# Patient Record
Sex: FEMALE | Race: WHITE | NOT HISPANIC OR LATINO | URBAN - METROPOLITAN AREA
[De-identification: names, ages, dates, MRNs, and addresses within clinical notes are randomized per-mention and may not be internally consistent; named-entity substitution may affect disease eponyms.]

---

## 2019-09-25 ENCOUNTER — EMERGENCY (EMERGENCY)
Facility: HOSPITAL | Age: 34
LOS: 1 days | Discharge: HOME | End: 2019-09-25
Attending: EMERGENCY MEDICINE
Payer: COMMERCIAL

## 2019-09-25 VITALS
OXYGEN SATURATION: 99 % | RESPIRATION RATE: 16 BRPM | DIASTOLIC BLOOD PRESSURE: 81 MMHG | HEIGHT: 65 IN | WEIGHT: 145.06 LBS | TEMPERATURE: 98 F | SYSTOLIC BLOOD PRESSURE: 120 MMHG | HEART RATE: 79 BPM

## 2019-09-25 PROCEDURE — 99283 EMERGENCY DEPT VISIT LOW MDM: CPT

## 2019-09-25 RX ORDER — LIDOCAINE 4 G/100G
1 CREAM TOPICAL
Qty: 5 | Refills: 0
Start: 2019-09-25 | End: 2019-09-29

## 2019-09-25 RX ORDER — ACETAMINOPHEN 500 MG
975 TABLET ORAL ONCE
Refills: 0 | Status: COMPLETED | OUTPATIENT
Start: 2019-09-25 | End: 2019-09-25

## 2019-09-25 RX ORDER — LIDOCAINE 4 G/100G
1 CREAM TOPICAL ONCE
Refills: 0 | Status: COMPLETED | OUTPATIENT
Start: 2019-09-25 | End: 2019-09-25

## 2019-09-25 RX ORDER — IBUPROFEN 200 MG
1 TABLET ORAL
Qty: 40 | Refills: 0
Start: 2019-09-25 | End: 2019-10-04

## 2019-09-25 RX ORDER — METHOCARBAMOL 500 MG/1
1500 TABLET, FILM COATED ORAL ONCE
Refills: 0 | Status: COMPLETED | OUTPATIENT
Start: 2019-09-25 | End: 2019-09-25

## 2019-09-25 RX ORDER — METHOCARBAMOL 500 MG/1
2 TABLET, FILM COATED ORAL
Qty: 30 | Refills: 0
Start: 2019-09-25 | End: 2019-09-29

## 2019-09-25 RX ADMIN — Medication 975 MILLIGRAM(S): at 12:33

## 2019-09-25 RX ADMIN — LIDOCAINE 1 PATCH: 4 CREAM TOPICAL at 12:33

## 2019-09-25 RX ADMIN — METHOCARBAMOL 1500 MILLIGRAM(S): 500 TABLET, FILM COATED ORAL at 12:33

## 2019-09-25 NOTE — ED PROVIDER NOTE - PATIENT PORTAL LINK FT
You can access the FollowMyHealth Patient Portal offered by [facility] by registering at the following website: [email or web address]. By joining [facility]’s FollowMyHealth portal, you will also be able to view your health information using other applications (apps) compatible with our system.
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
## 2019-09-25 NOTE — ED PROVIDER NOTE - PLAN OF CARE
Will provide pt with muscle relaxant and pain medication. Pt will be advised to f/u with PMD within 24-48 hours.
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
## 2020-04-01 ENCOUNTER — APPOINTMENT (OUTPATIENT)
Dept: ANTEPARTUM | Facility: CLINIC | Age: 35
End: 2020-04-01

## 2021-12-24 ENCOUNTER — TRANSCRIPTION ENCOUNTER (OUTPATIENT)
Age: 36
End: 2021-12-24

## 2022-10-10 NOTE — ED PROVIDER NOTE - PRO INTERPRETER NEED 2
English Topical Retinoid counseling:  Patient advised to apply a pea-sized amount only at bedtime and wait 30 minutes after washing their face before applying.  If too drying, patient may add a non-comedogenic moisturizer. The patient verbalized understanding of the proper use and possible adverse effects of retinoids.  All of the patient's questions and concerns were addressed.

## 2023-02-17 ENCOUNTER — OFFICE (OUTPATIENT)
Dept: URBAN - METROPOLITAN AREA CLINIC 113 | Facility: CLINIC | Age: 38
Setting detail: OPHTHALMOLOGY
End: 2023-02-17
Payer: COMMERCIAL

## 2023-02-17 ENCOUNTER — NON-APPOINTMENT (OUTPATIENT)
Age: 38
End: 2023-02-17

## 2023-02-17 DIAGNOSIS — H16.223: ICD-10-CM

## 2023-02-17 DIAGNOSIS — H01.005: ICD-10-CM

## 2023-02-17 DIAGNOSIS — H01.002: ICD-10-CM

## 2023-02-17 DIAGNOSIS — H17.9: ICD-10-CM

## 2023-02-17 DIAGNOSIS — D23.122: ICD-10-CM

## 2023-02-17 DIAGNOSIS — H04.123: ICD-10-CM

## 2023-02-17 DIAGNOSIS — H52.03: ICD-10-CM

## 2023-02-17 PROCEDURE — 92285 EXTERNAL OCULAR PHOTOGRAPHY: CPT | Performed by: OPHTHALMOLOGY

## 2023-02-17 PROCEDURE — 92015 DETERMINE REFRACTIVE STATE: CPT | Performed by: OPHTHALMOLOGY

## 2023-02-17 PROCEDURE — 92014 COMPRE OPH EXAM EST PT 1/>: CPT | Performed by: OPHTHALMOLOGY

## 2023-02-17 ASSESSMENT — LID EXAM ASSESSMENTS
OD_BLEPHARITIS: RLL T
OS_BLEPHARITIS: LLL T

## 2023-02-17 ASSESSMENT — AXIALLENGTH_DERIVED
OS_AL: 24.6441
OS_AL: 24.6974
OS_AL: 26.6559
OS_AL: 24.6974
OD_AL: 24.8015

## 2023-02-17 ASSESSMENT — REFRACTION_MANIFEST
OD_SPHERE: -4.50
OD_AXIS: 014
OS_SPHERE: +0.25
OD_VA1: 20/20
OD_CYLINDER: -0.75
OD_SPHERE: PLANO
OS_AXIS: 175
OD_AXIS: 011
OS_VA1: 20/20
OD_VA1: 20/20
OS_AXIS: 171
OS_CYLINDER: -0.50
OS_CYLINDER: -0.50
OS_VA1: 20/20
OS_VA1: 20/15-
OS_AXIS: 180
OU_VA: 20/20
OD_CYLINDER: -0.75
OD_VA1: 20/15
OS_SPHERE: +0.25
OS_CYLINDER: -0.50
OD_SPHERE: -4.50
OS_SPHERE: -4.00
OS_SPHERE: -3.75
OD_SPHERE: PLANO

## 2023-02-17 ASSESSMENT — VISUAL ACUITY
OD_BCVA: 20/20
OS_BCVA: 20/20

## 2023-02-17 ASSESSMENT — CONFRONTATIONAL VISUAL FIELD TEST (CVF)
OD_FINDINGS: FULL
OS_FINDINGS: FULL

## 2023-02-17 ASSESSMENT — TONOMETRY
OD_IOP_MMHG: 11
OS_IOP_MMHG: 11

## 2023-02-17 ASSESSMENT — REFRACTION_CURRENTRX
OS_AXIS: 171
OD_CYLINDER: -0.75
OD_AXIS: 014
OS_OVR_VA: 20/
OD_VPRISM_DIRECTION: SV
OS_SPHERE: -+0.25
OS_CYLINDER: -0.50
OS_VPRISM_DIRECTION: SV
OD_SPHERE: PLANO
OD_OVR_VA: 20/

## 2023-02-17 ASSESSMENT — SUPERFICIAL PUNCTATE KERATITIS (SPK)
OS_SPK: T
OD_SPK: T

## 2023-02-17 ASSESSMENT — KERATOMETRY
OS_K2POWER_DIOPTERS: 41.00
OS_K1POWER_DIOPTERS: 40.25
OD_K1POWER_DIOPTERS: 40.00
OS_AXISANGLE_DEGREES: 080
OD_AXISANGLE_DEGREES: 095
OD_K2POWER_DIOPTERS: 41.00

## 2023-02-17 ASSESSMENT — REFRACTION_AUTOREFRACTION
OS_AXIS: 179
OD_SPHERE: +0.25
OS_SPHERE: +0.50
OD_CYLINDER: -0.75
OS_CYLINDER: -0.75
OD_AXIS: 009

## 2023-02-17 ASSESSMENT — SPHEQUIV_DERIVED
OD_SPHEQUIV: -0.125
OS_SPHEQUIV: -4.25
OS_SPHEQUIV: 0
OS_SPHEQUIV: 0.125
OS_SPHEQUIV: 0

## 2023-03-23 ENCOUNTER — OFFICE (OUTPATIENT)
Dept: URBAN - METROPOLITAN AREA CLINIC 113 | Facility: CLINIC | Age: 38
Setting detail: OPHTHALMOLOGY
End: 2023-03-23
Payer: COMMERCIAL

## 2023-03-23 DIAGNOSIS — H02.824: ICD-10-CM

## 2023-03-23 DIAGNOSIS — D23.122: ICD-10-CM

## 2023-03-23 PROCEDURE — 92012 INTRM OPH EXAM EST PATIENT: CPT | Performed by: OPHTHALMOLOGY

## 2023-03-23 PROCEDURE — 92285 EXTERNAL OCULAR PHOTOGRAPHY: CPT | Performed by: OPHTHALMOLOGY

## 2023-03-23 ASSESSMENT — REFRACTION_MANIFEST
OS_SPHERE: +0.25
OD_AXIS: 011
OS_VA1: 20/20
OS_AXIS: 171
OS_SPHERE: -4.00
OS_SPHERE: +0.25
OD_VA1: 20/20
OD_VA1: 20/20
OS_AXIS: 180
OD_SPHERE: -4.50
OU_VA: 20/20
OS_CYLINDER: -0.50
OS_VA1: 20/15-
OD_SPHERE: PLANO
OS_CYLINDER: -0.50
OD_SPHERE: PLANO
OD_CYLINDER: -0.75
OS_CYLINDER: -0.50
OS_VA1: 20/20
OS_AXIS: 175
OD_VA1: 20/15
OD_AXIS: 014
OD_CYLINDER: -0.75
OS_SPHERE: -3.75
OD_SPHERE: -4.50

## 2023-03-23 ASSESSMENT — KERATOMETRY
OS_K2POWER_DIOPTERS: 41.00
OD_AXISANGLE_DEGREES: 095
OS_K1POWER_DIOPTERS: 40.25
OS_AXISANGLE_DEGREES: 080
OD_K2POWER_DIOPTERS: 41.00
OD_K1POWER_DIOPTERS: 40.00

## 2023-03-23 ASSESSMENT — SUPERFICIAL PUNCTATE KERATITIS (SPK)
OS_SPK: T
OD_SPK: T

## 2023-03-23 ASSESSMENT — SPHEQUIV_DERIVED
OD_SPHEQUIV: -0.125
OS_SPHEQUIV: 0
OS_SPHEQUIV: 0.125
OS_SPHEQUIV: -4.25
OS_SPHEQUIV: 0

## 2023-03-23 ASSESSMENT — AXIALLENGTH_DERIVED
OD_AL: 24.8015
OS_AL: 24.6974
OS_AL: 24.6441
OS_AL: 24.6974
OS_AL: 26.6559

## 2023-03-23 ASSESSMENT — CONFRONTATIONAL VISUAL FIELD TEST (CVF)
OS_FINDINGS: FULL
OD_FINDINGS: FULL

## 2023-03-23 ASSESSMENT — REFRACTION_AUTOREFRACTION
OS_AXIS: 179
OD_SPHERE: +0.25
OS_SPHERE: +0.50
OD_CYLINDER: -0.75
OD_AXIS: 009
OS_CYLINDER: -0.75

## 2023-03-23 ASSESSMENT — REFRACTION_CURRENTRX
OS_OVR_VA: 20/
OS_VPRISM_DIRECTION: SV
OD_VPRISM_DIRECTION: SV
OS_AXIS: 171
OD_SPHERE: PLANO
OS_SPHERE: -+0.25
OS_CYLINDER: -0.50
OD_OVR_VA: 20/
OD_CYLINDER: -0.75
OD_AXIS: 014

## 2023-03-23 ASSESSMENT — VISUAL ACUITY
OS_BCVA: 20/20
OD_BCVA: 20/25

## 2023-03-23 ASSESSMENT — LID EXAM ASSESSMENTS
OD_BLEPHARITIS: RLL T
OS_BLEPHARITIS: LLL T

## 2023-12-16 ENCOUNTER — OFFICE (OUTPATIENT)
Dept: URBAN - METROPOLITAN AREA CLINIC 12 | Facility: CLINIC | Age: 38
Setting detail: OPHTHALMOLOGY
End: 2023-12-16
Payer: COMMERCIAL

## 2023-12-16 DIAGNOSIS — H00.14: ICD-10-CM

## 2023-12-16 PROCEDURE — 92012 INTRM OPH EXAM EST PATIENT: CPT | Performed by: OPHTHALMOLOGY

## 2023-12-16 ASSESSMENT — REFRACTION_CURRENTRX
OD_AXIS: 014
OS_AXIS: 171
OD_OVR_VA: 20/
OS_CYLINDER: -0.50
OD_VPRISM_DIRECTION: SV
OS_SPHERE: -+0.25
OS_OVR_VA: 20/
OS_VPRISM_DIRECTION: SV
OD_SPHERE: PLANO
OD_CYLINDER: -0.75

## 2023-12-16 ASSESSMENT — CONFRONTATIONAL VISUAL FIELD TEST (CVF)
OS_FINDINGS: FULL
OD_FINDINGS: FULL

## 2023-12-16 ASSESSMENT — REFRACTION_MANIFEST
OD_VA1: 20/20
OS_AXIS: 180
OS_VA1: 20/20
OS_SPHERE: +0.25
OS_SPHERE: -4.00
OS_AXIS: 171
OD_CYLINDER: -0.75
OS_VA1: 20/15-
OS_CYLINDER: -0.50
OS_SPHERE: +0.25
OS_AXIS: 175
OD_VA1: 20/15
OD_CYLINDER: -0.75
OD_AXIS: 014
OS_SPHERE: -3.75
OS_CYLINDER: -0.50
OD_VA1: 20/20
OD_AXIS: 011
OD_SPHERE: -4.50
OD_SPHERE: PLANO
OU_VA: 20/20
OD_SPHERE: -4.50
OS_CYLINDER: -0.50
OD_SPHERE: PLANO
OS_VA1: 20/20

## 2023-12-16 ASSESSMENT — REFRACTION_AUTOREFRACTION
OS_SPHERE: +0.25
OD_CYLINDER: -0.75
OS_AXIS: 177
OD_AXIS: 017
OS_CYLINDER: -0.75
OD_SPHERE: +0.25

## 2023-12-16 ASSESSMENT — SUPERFICIAL PUNCTATE KERATITIS (SPK)
OS_SPK: T
OD_SPK: T

## 2023-12-16 ASSESSMENT — SPHEQUIV_DERIVED
OS_SPHEQUIV: -4.25
OS_SPHEQUIV: -0.125
OS_SPHEQUIV: 0
OS_SPHEQUIV: 0
OD_SPHEQUIV: -0.125

## 2023-12-16 ASSESSMENT — LID EXAM ASSESSMENTS
OS_BLEPHARITIS: LLL T
OD_BLEPHARITIS: RLL T

## 2024-01-02 ENCOUNTER — OFFICE (OUTPATIENT)
Dept: URBAN - METROPOLITAN AREA CLINIC 100 | Facility: CLINIC | Age: 39
Setting detail: OPHTHALMOLOGY
End: 2024-01-02
Payer: COMMERCIAL

## 2024-01-02 ENCOUNTER — RX ONLY (RX ONLY)
Age: 39
End: 2024-01-02

## 2024-01-02 DIAGNOSIS — H02.824: ICD-10-CM

## 2024-01-02 DIAGNOSIS — H00.14: ICD-10-CM

## 2024-01-02 PROBLEM — H52.7 REFRACTIVE ERROR: Status: ACTIVE | Noted: 2023-12-16

## 2024-01-02 PROCEDURE — 92285 EXTERNAL OCULAR PHOTOGRAPHY: CPT | Performed by: OPHTHALMOLOGY

## 2024-01-02 PROCEDURE — 67800 REMOVE EYELID LESION: CPT | Mod: 51,E1 | Performed by: OPHTHALMOLOGY

## 2024-01-02 PROCEDURE — 67840 REMOVE EYELID LESION: CPT | Mod: E1 | Performed by: OPHTHALMOLOGY

## 2024-01-02 ASSESSMENT — SPHEQUIV_DERIVED
OS_SPHEQUIV: -0.125
OD_SPHEQUIV: -0.125

## 2024-01-02 ASSESSMENT — REFRACTION_AUTOREFRACTION
OS_SPHERE: +0.25
OD_AXIS: 017
OD_SPHERE: +0.25
OD_CYLINDER: -0.75
OS_AXIS: 177
OS_CYLINDER: -0.75

## 2024-01-02 ASSESSMENT — SUPERFICIAL PUNCTATE KERATITIS (SPK)
OS_SPK: T
OD_SPK: T

## 2024-01-02 ASSESSMENT — CONFRONTATIONAL VISUAL FIELD TEST (CVF)
OD_FINDINGS: FULL
OS_FINDINGS: FULL

## 2024-01-02 ASSESSMENT — LID EXAM ASSESSMENTS
OD_BLEPHARITIS: RLL T
OS_BLEPHARITIS: LLL T

## 2024-01-16 ENCOUNTER — OFFICE (OUTPATIENT)
Dept: URBAN - METROPOLITAN AREA CLINIC 100 | Facility: CLINIC | Age: 39
Setting detail: OPHTHALMOLOGY
End: 2024-01-16
Payer: COMMERCIAL

## 2024-01-16 DIAGNOSIS — H02.824: ICD-10-CM

## 2024-01-16 DIAGNOSIS — H01.005: ICD-10-CM

## 2024-01-16 DIAGNOSIS — H00.14: ICD-10-CM

## 2024-01-16 DIAGNOSIS — H01.002: ICD-10-CM

## 2024-01-16 PROCEDURE — 99213 OFFICE O/P EST LOW 20 MIN: CPT | Performed by: OPHTHALMOLOGY

## 2024-01-16 ASSESSMENT — LID EXAM ASSESSMENTS
OS_BLEPHARITIS: LLL T
OD_BLEPHARITIS: RLL T

## 2024-01-16 ASSESSMENT — SPHEQUIV_DERIVED
OS_SPHEQUIV: -0.125
OD_SPHEQUIV: -0.125

## 2024-01-16 ASSESSMENT — REFRACTION_AUTOREFRACTION
OS_SPHERE: +0.25
OS_CYLINDER: -0.75
OD_CYLINDER: -0.75
OS_AXIS: 177
OD_SPHERE: +0.25
OD_AXIS: 017

## 2024-01-16 ASSESSMENT — CONFRONTATIONAL VISUAL FIELD TEST (CVF)
OS_FINDINGS: FULL
OD_FINDINGS: FULL

## 2024-01-16 ASSESSMENT — SUPERFICIAL PUNCTATE KERATITIS (SPK)
OS_SPK: T
OD_SPK: T

## 2024-08-06 ENCOUNTER — APPOINTMENT (OUTPATIENT)
Dept: DERMATOLOGY | Facility: CLINIC | Age: 39
End: 2024-08-06

## 2024-08-06 PROCEDURE — 99204 OFFICE O/P NEW MOD 45 MIN: CPT

## 2025-03-13 ENCOUNTER — APPOINTMENT (OUTPATIENT)
Dept: INTERNAL MEDICINE | Facility: CLINIC | Age: 40
End: 2025-03-13
Payer: COMMERCIAL

## 2025-03-13 VITALS
WEIGHT: 163 LBS | BODY MASS INDEX: 27.16 KG/M2 | SYSTOLIC BLOOD PRESSURE: 110 MMHG | OXYGEN SATURATION: 100 % | HEIGHT: 65 IN | HEART RATE: 71 BPM | DIASTOLIC BLOOD PRESSURE: 72 MMHG | TEMPERATURE: 98 F

## 2025-03-13 DIAGNOSIS — E66.3 OVERWEIGHT: ICD-10-CM

## 2025-03-13 DIAGNOSIS — Z80.3 FAMILY HISTORY OF MALIGNANT NEOPLASM OF BREAST: ICD-10-CM

## 2025-03-13 DIAGNOSIS — Z00.00 ENCOUNTER FOR GENERAL ADULT MEDICAL EXAMINATION W/OUT ABNORMAL FINDINGS: ICD-10-CM

## 2025-03-13 DIAGNOSIS — F41.1 GENERALIZED ANXIETY DISORDER: ICD-10-CM

## 2025-03-13 DIAGNOSIS — E66.9 OVERWEIGHT: ICD-10-CM

## 2025-03-13 PROCEDURE — G0447 BEHAVIOR COUNSEL OBESITY 15M: CPT | Mod: NC,59

## 2025-03-13 PROCEDURE — 99385 PREV VISIT NEW AGE 18-39: CPT

## 2025-03-13 RX ORDER — ZINC SULFATE 50(220)MG
CAPSULE ORAL
Refills: 0 | Status: ACTIVE | COMMUNITY

## 2025-03-13 RX ORDER — GLUC/MSM/COLGN2/HYAL/ANTIARTH3 375-375-20
TABLET ORAL
Refills: 0 | Status: ACTIVE | COMMUNITY

## 2025-03-18 ENCOUNTER — LABORATORY RESULT (OUTPATIENT)
Age: 40
End: 2025-03-18

## 2025-03-23 LAB
ALBUMIN SERPL ELPH-MCNC: 4.6 G/DL
ALP BLD-CCNC: 60 U/L
ALT SERPL-CCNC: 13 U/L
ANION GAP SERPL CALC-SCNC: 13 MMOL/L
APPEARANCE: ABNORMAL
AST SERPL-CCNC: 16 U/L
BASOPHILS # BLD AUTO: 0.02 K/UL
BASOPHILS NFR BLD AUTO: 0.3 %
BILIRUB SERPL-MCNC: 0.4 MG/DL
BILIRUBIN URINE: NEGATIVE
BLOOD URINE: ABNORMAL
BUN SERPL-MCNC: 18 MG/DL
CALCIUM SERPL-MCNC: 9.6 MG/DL
CHLORIDE SERPL-SCNC: 104 MMOL/L
CHOLEST SERPL-MCNC: 228 MG/DL
CO2 SERPL-SCNC: 22 MMOL/L
COLOR: YELLOW
CREAT SERPL-MCNC: 0.87 MG/DL
EGFRCR SERPLBLD CKD-EPI 2021: 87 ML/MIN/1.73M2
EOSINOPHIL # BLD AUTO: 0.05 K/UL
EOSINOPHIL NFR BLD AUTO: 0.9 %
FERRITIN SERPL-MCNC: 120 NG/ML
GLUCOSE QUALITATIVE U: NEGATIVE MG/DL
GLUCOSE SERPL-MCNC: 91 MG/DL
HCT VFR BLD CALC: 38.3 %
HDLC SERPL-MCNC: 42 MG/DL
HGB BLD-MCNC: 13.3 G/DL
IMM GRANULOCYTES NFR BLD AUTO: 0.2 %
IRON SATN MFR SERPL: 28 %
IRON SERPL-MCNC: 88 UG/DL
KETONES URINE: NEGATIVE MG/DL
LDLC SERPL CALC-MCNC: 167 MG/DL
LEUKOCYTE ESTERASE URINE: NEGATIVE
LYMPHOCYTES # BLD AUTO: 2.72 K/UL
LYMPHOCYTES NFR BLD AUTO: 47.5 %
MAN DIFF?: NORMAL
MCHC RBC-ENTMCNC: 31.1 PG
MCHC RBC-ENTMCNC: 34.7 G/DL
MCV RBC AUTO: 89.7 FL
MONOCYTES # BLD AUTO: 0.33 K/UL
MONOCYTES NFR BLD AUTO: 5.8 %
NEUTROPHILS # BLD AUTO: 2.6 K/UL
NEUTROPHILS NFR BLD AUTO: 45.3 %
NITRITE URINE: NEGATIVE
NONHDLC SERPL-MCNC: 186 MG/DL
PH URINE: 5.5
PLATELET # BLD AUTO: 263 K/UL
POTASSIUM SERPL-SCNC: 4.5 MMOL/L
PROT SERPL-MCNC: 7.5 G/DL
PROTEIN URINE: NEGATIVE MG/DL
RBC # BLD: 4.27 M/UL
RBC # FLD: 12.2 %
SODIUM SERPL-SCNC: 139 MMOL/L
SPECIFIC GRAVITY URINE: 1.02
TIBC SERPL-MCNC: 309 UG/DL
TRIGL SERPL-MCNC: 104 MG/DL
TSH SERPL-ACNC: 0.79 UIU/ML
UIBC SERPL-MCNC: 221 UG/DL
UROBILINOGEN URINE: 0.2 MG/DL
VIT B12 SERPL-MCNC: 656 PG/ML
WBC # FLD AUTO: 5.73 K/UL

## 2025-03-24 DIAGNOSIS — R82.90 UNSPECIFIED ABNORMAL FINDINGS IN URINE: ICD-10-CM

## 2025-08-06 ENCOUNTER — APPOINTMENT (OUTPATIENT)
Dept: DERMATOLOGY | Facility: CLINIC | Age: 40
End: 2025-08-06

## 2025-09-05 ENCOUNTER — APPOINTMENT (OUTPATIENT)
Dept: DERMATOLOGY | Facility: CLINIC | Age: 40
End: 2025-09-05
Payer: COMMERCIAL

## 2025-09-05 PROCEDURE — 99213 OFFICE O/P EST LOW 20 MIN: CPT
